# Patient Record
Sex: MALE | Race: WHITE | NOT HISPANIC OR LATINO
[De-identification: names, ages, dates, MRNs, and addresses within clinical notes are randomized per-mention and may not be internally consistent; named-entity substitution may affect disease eponyms.]

---

## 2017-05-09 PROBLEM — Z00.00 ENCOUNTER FOR PREVENTIVE HEALTH EXAMINATION: Status: ACTIVE | Noted: 2017-05-09

## 2017-05-30 ENCOUNTER — APPOINTMENT (OUTPATIENT)
Dept: UROLOGY | Facility: CLINIC | Age: 27
End: 2017-05-30

## 2017-05-30 VITALS
DIASTOLIC BLOOD PRESSURE: 70 MMHG | TEMPERATURE: 98.1 F | HEIGHT: 72 IN | HEART RATE: 82 BPM | BODY MASS INDEX: 22.35 KG/M2 | SYSTOLIC BLOOD PRESSURE: 120 MMHG | WEIGHT: 165 LBS

## 2017-07-07 ENCOUNTER — APPOINTMENT (OUTPATIENT)
Dept: UROLOGY | Facility: CLINIC | Age: 27
End: 2017-07-07

## 2017-07-07 VITALS
SYSTOLIC BLOOD PRESSURE: 129 MMHG | DIASTOLIC BLOOD PRESSURE: 74 MMHG | TEMPERATURE: 98.6 F | HEIGHT: 72 IN | BODY MASS INDEX: 22.35 KG/M2 | WEIGHT: 165 LBS | HEART RATE: 80 BPM

## 2017-12-18 ENCOUNTER — OUTPATIENT (OUTPATIENT)
Dept: OUTPATIENT SERVICES | Facility: HOSPITAL | Age: 27
LOS: 1 days | Discharge: HOME | End: 2017-12-18
Payer: MEDICAID

## 2017-12-18 PROCEDURE — 55535 REVISE SPERMATIC CORD VEINS: CPT | Mod: 50

## 2017-12-18 RX ORDER — DOCUSATE SODIUM 100 MG
1 CAPSULE ORAL
Qty: 30 | Refills: 0 | OUTPATIENT
Start: 2017-12-18

## 2018-01-19 ENCOUNTER — APPOINTMENT (OUTPATIENT)
Dept: UROLOGY | Facility: CLINIC | Age: 28
End: 2018-01-19
Payer: MEDICAID

## 2018-01-19 VITALS
TEMPERATURE: 98.9 F | SYSTOLIC BLOOD PRESSURE: 122 MMHG | HEIGHT: 72 IN | BODY MASS INDEX: 22.35 KG/M2 | WEIGHT: 165 LBS | HEART RATE: 80 BPM | OXYGEN SATURATION: 98 % | DIASTOLIC BLOOD PRESSURE: 72 MMHG

## 2018-01-19 PROCEDURE — 99024 POSTOP FOLLOW-UP VISIT: CPT

## 2018-01-19 RX ORDER — DOCUSATE SODIUM 100 MG/1
100 CAPSULE ORAL
Qty: 30 | Refills: 0 | Status: ACTIVE | COMMUNITY
Start: 2017-12-18

## 2018-04-27 ENCOUNTER — APPOINTMENT (OUTPATIENT)
Dept: UROLOGY | Facility: CLINIC | Age: 28
End: 2018-04-27

## 2018-07-24 ENCOUNTER — APPOINTMENT (OUTPATIENT)
Dept: UROLOGY | Facility: CLINIC | Age: 28
End: 2018-07-24
Payer: MEDICAID

## 2018-07-24 VITALS
BODY MASS INDEX: 23.7 KG/M2 | TEMPERATURE: 100 F | SYSTOLIC BLOOD PRESSURE: 106 MMHG | HEIGHT: 72 IN | OXYGEN SATURATION: 97 % | DIASTOLIC BLOOD PRESSURE: 69 MMHG | WEIGHT: 175 LBS | RESPIRATION RATE: 16 BRPM | HEART RATE: 95 BPM

## 2018-07-24 PROCEDURE — 99213 OFFICE O/P EST LOW 20 MIN: CPT

## 2019-09-16 ENCOUNTER — APPOINTMENT (OUTPATIENT)
Dept: UROLOGY | Facility: CLINIC | Age: 29
End: 2019-09-16
Payer: MEDICAID

## 2019-09-16 VITALS
WEIGHT: 175 LBS | SYSTOLIC BLOOD PRESSURE: 110 MMHG | DIASTOLIC BLOOD PRESSURE: 70 MMHG | HEIGHT: 72 IN | BODY MASS INDEX: 23.7 KG/M2 | HEART RATE: 95 BPM | TEMPERATURE: 98.7 F

## 2019-09-16 DIAGNOSIS — F17.200 NICOTINE DEPENDENCE, UNSPECIFIED, UNCOMPLICATED: ICD-10-CM

## 2019-09-16 DIAGNOSIS — I86.1 SCROTAL VARICES: ICD-10-CM

## 2019-09-16 DIAGNOSIS — Z86.79 PERSONAL HISTORY OF OTHER DISEASES OF THE CIRCULATORY SYSTEM: ICD-10-CM

## 2019-09-16 PROCEDURE — 99214 OFFICE O/P EST MOD 30 MIN: CPT

## 2019-09-16 RX ORDER — OXYCODONE AND ACETAMINOPHEN 5; 325 MG/1; MG/1
5-325 TABLET ORAL
Qty: 20 | Refills: 0 | Status: DISCONTINUED | COMMUNITY
Start: 2017-12-18 | End: 2019-09-16

## 2019-09-16 RX ORDER — CHLORHEXIDINE GLUCONATE, 0.12% ORAL RINSE 1.2 MG/ML
0.12 SOLUTION DENTAL
Qty: 473 | Refills: 0 | Status: DISCONTINUED | COMMUNITY
Start: 2017-12-11 | End: 2019-09-16

## 2019-09-16 NOTE — HISTORY OF PRESENT ILLNESS
[FreeTextEntry1] : Elimeclech 28\par Gela 27\par  7 years\par s/p bilateral varicocelectomy Dr Ross\par Prior to surgery had pain which is diminished since surgery\par Prior to surgery\par 1.7 mL/12,000,000 per mL/41% morphology/3% motility.\par October 2015 total testosterone 425 LH 3.8, FSH 4.\par

## 2019-09-16 NOTE — ASSESSMENT
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  He is improved but continues to be mildly symptomatic.\par He and his wife undergone stimulated cycles Timed sexual intercourse and IUI.\par His semen analysis demonstrates severe teratozoospermia.\par We discussed possible recurrence of his varicocele on the right and discussed ultrasound to evaluate.\par We discussed ProXeed and Profertil\par We discussed proceeding with IVF\par Discussed DNA fragmentation study.  Discussed controversial value.  Joyce is interested in information to help understand potential significance of abnormal morphology\par Plan:\par 1. scrotal ultrasound re possible recurrence\par 2. DNA fragmentation\par 3. consider IVF\par 4. Proxeed/Progertil\par \par

## 2019-09-16 NOTE — PHYSICAL EXAM
[Bowel Sounds] : normal bowel sounds [General Appearance - Well Developed] : well developed [Abdomen Soft] : soft [Abdomen Tenderness] : non-tender [Urethral Meatus] : meatus normal [Penis Abnormality] : normal circumcised penis [Epididymis] : the epididymides were normal [Testes Tenderness] : no tenderness of the testes [Testes Mass (___cm)] : there were no testicular masses [Skin Color & Pigmentation] : normal skin color and pigmentation [Heart Rate And Rhythm] : Heart rate and rhythm were normal [] : no respiratory distress [Mood] : the mood was normal [No Focal Deficits] : no focal deficits [Normal Station and Gait] : the gait and station were normal for the patient's age [Inguinal Lymph Nodes Enlarged Bilaterally] : inguinal [FreeTextEntry1] : cord fullness on the right with refux

## 2024-04-03 ENCOUNTER — APPOINTMENT (OUTPATIENT)
Dept: UROLOGY | Facility: CLINIC | Age: 34
End: 2024-04-03
Payer: MEDICAID

## 2024-04-03 ENCOUNTER — NON-APPOINTMENT (OUTPATIENT)
Age: 34
End: 2024-04-03

## 2024-04-03 VITALS
BODY MASS INDEX: 25.18 KG/M2 | OXYGEN SATURATION: 97 % | WEIGHT: 190 LBS | DIASTOLIC BLOOD PRESSURE: 74 MMHG | SYSTOLIC BLOOD PRESSURE: 124 MMHG | HEIGHT: 73 IN | TEMPERATURE: 97.2 F | HEART RATE: 66 BPM

## 2024-04-03 PROCEDURE — 99204 OFFICE O/P NEW MOD 45 MIN: CPT

## 2024-04-03 NOTE — HISTORY OF PRESENT ILLNESS
[FreeTextEntry1] : Dear  Self referred  Thank you so much for the referral to help care for your patient.  Chief Complaint: Varicocele. Date of first visit: 04/03/2024  MICHELA GRANT is a 33 year old male who presents for recurrent varicocele.  He underwent bilateral varicocelectomy with Dr. Shepard (2018) and presents with recurrent discomfort/pain and abnormal semenalysis. Pain is worse at the end of the day, L>R.  In office-ULS showed testicular size asymmetry (L - 13cm, R-17cm). Left varicocele 3.1mm. No significant Right varicocele (largest diameter 2.7mm).  Repeat Semenalysis showed abnormal morphology.    U/S Hx: U/S Scrotum   09/14/2023- 1. Since June 9, 2017 redemonstrated bilateral varicoceles, left greater than right. 2. New bilateral scattered microcalcifications, nonspecific. No testicular mass.  The patient denies fevers, chills, nausea and or vomiting and no unexplained weight loss.  All pertinent laboratory, films and physician notes were reviewed.  Questionnaire results were discussed with patient.

## 2024-04-03 NOTE — PHYSICAL EXAM
[Normal Appearance] : normal appearance [General Appearance - Well Developed] : well developed [Edema] : no peripheral edema [] : no respiratory distress [Abdomen Tenderness] : non-tender [Normal Station and Gait] : the gait and station were normal for the patient's age [Skin Lesions] : no skin lesions [Oriented To Time, Place, And Person] : oriented to person, place, and time

## 2024-04-03 NOTE — ASSESSMENT
[FreeTextEntry1] : 32yo M who presents with discomfort/pain and issues of infertility. ULS showed L 3.1mm varicocele. Testicular asymmetry (L-13cm vs R-17cm). Hx bilateral varicocelectomy (Dr. Shepard, 2018).   Left varicocele - Notes discomfort/pain - SA only with abnormal morphology (?100% Abn, teratospermia) - Explained that testicular asymmetry may be present from prior existing varicocele, unable to definitively say; however, we discussed that his partner/wife may have contributing factors to infertility. - Mallimpati I  We discussed disease process and treatment options for symptomatic varicoceles and infertility. The patient understands the scrotal pain may resolve the heaviness that he feels in his scrotum however improving his fertility is not 100% guaranteed. There are studies that show there is improvement in semen parameters after treatment of the varicocele.  We discussed the risks and benefits alternatives associated with gonadal embolization regards to nontarget embolization complications of access and migration of coils. We also discussed the success rates of procedures in comparison to surgery.   We had an in-depth conversation regarding the benefit of varicocelectomy today. He understands the literature which overwhelming reports a benefit in improvement in semen parameters. He also understands that there is limited data in terms of spontaneous pregnancy and take-home baby rates. There is good data suggesting improved semen parameters which can possibly obviate the need for future IVF and has been shown to improve IVF outcomes in selected patients. We discussed the recent results of a Chaitanya review which reported that one natural pregnancy is achieved for every 3-5 varicocele repairs over an unknown time frame. We also spoke about the fact that there is a 3-to-6-month delay before improvement is seen in semen parameters. The role of advancing maternal age was discussed in depth.   Surgical risks, including a risk of infection, injury to the testicular artery (extremely rare), injury to the vas deferens and hydrocele were discussed, as was post operative pain and pain control. Post operative skin numbness in the anterior thigh/lateral scrotum were also discussed.    Thank you very much for allowing me to assist in the care of this patient. Should you have any additional questions or concerns please do not hesitate to contact me.  Sincerely,   Marcial Glasgow D.O. Professor of Urology and Radiology  of Urology at [facility] Director for Prostate Cancer [address], [address] [facility], [facility] Phone: [phone].

## 2024-04-05 DIAGNOSIS — R79.89 OTHER SPECIFIED ABNORMAL FINDINGS OF BLOOD CHEMISTRY: ICD-10-CM

## 2024-04-05 LAB
ANION GAP SERPL CALC-SCNC: 12 MMOL/L
BUN SERPL-MCNC: 12 MG/DL
CALCIUM SERPL-MCNC: 10.2 MG/DL
CHLORIDE SERPL-SCNC: 107 MMOL/L
CO2 SERPL-SCNC: 23 MMOL/L
CREAT SERPL-MCNC: 1.07 MG/DL
EGFR: 95 ML/MIN/1.73M2
GLUCOSE SERPL-MCNC: 86 MG/DL
HCT VFR BLD CALC: 45.9 %
HGB BLD-MCNC: 14.7 G/DL
LH SERPL-ACNC: 5.1 IU/L
MCHC RBC-ENTMCNC: 29.3 PG
MCHC RBC-ENTMCNC: 32 GM/DL
MCV RBC AUTO: 91.6 FL
PLATELET # BLD AUTO: 766 K/UL
POTASSIUM SERPL-SCNC: 4.6 MMOL/L
RBC # BLD: 5.01 M/UL
RBC # FLD: 13.3 %
SODIUM SERPL-SCNC: 142 MMOL/L
TESTOST SERPL-MCNC: 229 NG/DL
WBC # FLD AUTO: 6.56 K/UL

## 2024-04-15 ENCOUNTER — NON-APPOINTMENT (OUTPATIENT)
Age: 34
End: 2024-04-15

## 2024-04-16 ENCOUNTER — APPOINTMENT (OUTPATIENT)
Dept: INTERVENTIONAL RADIOLOGY/VASCULAR | Facility: HOSPITAL | Age: 34
End: 2024-04-16

## 2024-04-16 ENCOUNTER — OUTPATIENT (OUTPATIENT)
Dept: OUTPATIENT SERVICES | Facility: HOSPITAL | Age: 34
LOS: 1 days | End: 2024-04-16
Payer: COMMERCIAL

## 2024-04-16 ENCOUNTER — RESULT REVIEW (OUTPATIENT)
Age: 34
End: 2024-04-16

## 2024-04-16 ENCOUNTER — APPOINTMENT (OUTPATIENT)
Dept: UROLOGY | Facility: HOSPITAL | Age: 34
End: 2024-04-16

## 2024-04-16 PROCEDURE — 36012 PLACE CATHETER IN VEIN: CPT

## 2024-04-16 PROCEDURE — C1769: CPT

## 2024-04-16 PROCEDURE — 75831 VEIN X-RAY KIDNEY: CPT | Mod: 26,LT,59

## 2024-04-16 PROCEDURE — 76937 US GUIDE VASCULAR ACCESS: CPT

## 2024-04-16 PROCEDURE — 99153 MOD SED SAME PHYS/QHP EA: CPT

## 2024-04-16 PROCEDURE — C1894: CPT

## 2024-04-16 PROCEDURE — C1887: CPT

## 2024-04-16 PROCEDURE — 99152 MOD SED SAME PHYS/QHP 5/>YRS: CPT

## 2024-04-16 PROCEDURE — 75831 VEIN X-RAY KIDNEY: CPT | Mod: 59

## 2024-04-16 PROCEDURE — 37241 VASC EMBOLIZE/OCCLUDE VENOUS: CPT

## 2024-04-16 PROCEDURE — 76937 US GUIDE VASCULAR ACCESS: CPT | Mod: 26

## 2024-04-16 PROCEDURE — C1889: CPT

## 2024-04-16 NOTE — PRE PROCEDURE NOTE - PRE PROCEDURE EVALUATION
Patient Age: 33    Patient Gender: M    Procedure (including site / side if known): Left varicocele embolization    Diagnosis / Indication: Left varicocele    Interventional Radiology Attending Physician: Dr. Glasgow    Pertinent Medical History:    PAST MEDICAL & SURGICAL HISTORY:        Pertinent Labs:    Patient and Family aware:   [X]Y   [  ]N    We discussed disease process and treatment options for symptomatic varicoceles and infertility. The patient understands the scrotal pain may resolve the heaviness that he feels in his scrotum however improving his fertility is not 100% guaranteed. There are studies that show there is improvement in semen parameters after treatment of the varicocele.    We discussed the risks and benefits alternatives associated with gonadal embolization regards to nontarget embolization complications of access and migration of coils. We also discussed the success rates of procedures in comparison to surgery.    We had an in-depth conversation regarding the benefit of varicocelectomy today. He understands the literature which overwhelming reports a benefit in improvement in semen parameters. He also understands that there is limited data in terms of spontaneous pregnancy and take-home baby rates. There is good data suggesting improved semen parameters which can possibly obviate the need for future IVF and has been shown to improve IVF outcomes in selected patients. We discussed the recent results of a Boissevain review which reported that one natural pregnancy is achieved for every 3-5 varicocele repairs over an unknown time frame. We also spoke about the fact that there is a 3-to-6-month delay before improvement is seen in semen parameters. The role of advancing maternal age was discussed in depth.    Surgical risks, including a risk of infection, injury to the testicular artery (extremely rare), injury to the vas deferens and hydrocele were discussed, as was post operative pain and pain control. Post operative skin numbness in the anterior thigh/lateral scrotum were also discussed.

## 2024-05-29 ENCOUNTER — APPOINTMENT (OUTPATIENT)
Dept: UROLOGY | Facility: CLINIC | Age: 34
End: 2024-05-29
Payer: MEDICAID

## 2024-05-29 ENCOUNTER — NON-APPOINTMENT (OUTPATIENT)
Age: 34
End: 2024-05-29

## 2024-05-29 VITALS
BODY MASS INDEX: 25.18 KG/M2 | DIASTOLIC BLOOD PRESSURE: 55 MMHG | HEART RATE: 76 BPM | OXYGEN SATURATION: 97 % | WEIGHT: 190 LBS | SYSTOLIC BLOOD PRESSURE: 114 MMHG | HEIGHT: 73 IN | TEMPERATURE: 98.2 F

## 2024-05-29 DIAGNOSIS — Q55.61 CURVATURE OF PENIS (LATERAL): ICD-10-CM

## 2024-05-29 DIAGNOSIS — I86.1 SCROTAL VARICES: ICD-10-CM

## 2024-05-29 PROCEDURE — 99213 OFFICE O/P EST LOW 20 MIN: CPT

## 2024-05-29 NOTE — PHYSICAL EXAM
[General Appearance - Well Developed] : well developed [General Appearance - Well Nourished] : well nourished [Normal Appearance] : normal appearance [General Appearance - In No Acute Distress] : no acute distress [Edema] : no peripheral edema [] : no respiratory distress [Exaggerated Use Of Accessory Muscles For Inspiration] : no accessory muscle use [Abdomen Soft] : soft [Abdomen Tenderness] : non-tender [Urethral Meatus] : meatus normal [Testes Tenderness] : no tenderness of the testes [Normal Station and Gait] : the gait and station were normal for the patient's age [Skin Lesions] : no skin lesions [Oriented To Time, Place, And Person] : oriented to person, place, and time [de-identified] : No palpable varicocele

## 2024-05-29 NOTE — HISTORY OF PRESENT ILLNESS
[FreeTextEntry1] : Dear  Self referred  Thank you so much for the referral to help care for your patient.  Chief Complaint: Varicocele. Date of first visit: 04/03/2024  MICHELA GRANT is a 33 year old male who presents for recurrent varicocele. He underwent bilateral varicocelectomy with Dr. Shepard (2018) and presents with recurrent discomfort/pain and abnormal semenalysis. Pain is worse at the end of the day, L>R. In office-ULS showed testicular size asymmetry (L - 13cm, R-17cm). Left varicocele 3.1mm. No significant Right varicocele (largest diameter 2.7mm). Repeat Semenalysis showed abnormal morphology.  Patient now s/p Left varicocele embolization (coils and STS) on 4/16/24. Doing well post-operatively, no fever/chills, urinary changes. Notices a decrease in pain, but occasionally will feel some dullness/pain from left pain. He did mention notices sometimes with his erections that he is not straight, has downward curve a/w erection pain. No issues with penetration. Ongoing issue even prior to procedure.   U/S Hx: U/S Scrotum 09/14/2023- 1. Since June 9, 2017 redemonstrated bilateral varicoceles, left greater than right. 2. New bilateral scattered microcalcifications, nonspecific. No testicular mass.  The patient denies fevers, chills, nausea and or vomiting and no unexplained weight loss.  All pertinent laboratory, films and physician notes were reviewed. Questionnaire results were discussed with patient.

## 2024-05-29 NOTE — ASSESSMENT
[FreeTextEntry1] : 32yo M who presents with discomfort/pain and issues of infertility. ULS showed L 3.1mm varicocele. Testicular asymmetry (L-13cm vs R-17cm). Hx bilateral varicocelectomy (Dr. Shepard, 2018).  Left varicocele - Now s/p Left varicocele (prevoiusly grade II) on 4/16/24 - SA only with abnormal morphology (?100% Abn, teratospermia), will order post-op SA at 5-mo. He will confirm with Rabii regarding collection / necessity.  - Explained that testicular asymmetry may be present from prior existing varicocele, unable to definitively say; however, we discussed that his partner/wife may have contributing factors to infertility. - Repeat ULS at 5-month  Erectile Pain / curvature - Notices some downward curvature when erect that has existed for as long as he can remember  - Unclear if ventral curvature congenital chordee vs. peyroine's disease.   - Doesn't interfere with penetration, recommend watching unless worsens  Thank you very much for allowing me to assist in the care of this patient. Please do not hesitate to contact me with any additional questions or concerns.      Sincerely,     Marcial Glasgow D.O. Professor of Urology and Radiology  of Urology at [facility] Director for Prostate Cancer [address] [place] [address], [address] Floor [facility], [facility] Phone: [phone]

## 2024-10-30 ENCOUNTER — APPOINTMENT (OUTPATIENT)
Dept: UROLOGY | Facility: CLINIC | Age: 34
End: 2024-10-30

## 2024-10-30 ENCOUNTER — NON-APPOINTMENT (OUTPATIENT)
Age: 34
End: 2024-10-30